# Patient Record
Sex: MALE | Race: WHITE | Employment: FULL TIME | URBAN - METROPOLITAN AREA
[De-identification: names, ages, dates, MRNs, and addresses within clinical notes are randomized per-mention and may not be internally consistent; named-entity substitution may affect disease eponyms.]

---

## 2020-07-05 ENCOUNTER — APPOINTMENT (OUTPATIENT)
Dept: CT IMAGING | Age: 35
End: 2020-07-05
Attending: EMERGENCY MEDICINE
Payer: COMMERCIAL

## 2020-07-05 ENCOUNTER — ANESTHESIA EVENT (OUTPATIENT)
Dept: SURGERY | Age: 35
End: 2020-07-05
Payer: COMMERCIAL

## 2020-07-05 ENCOUNTER — ANESTHESIA (OUTPATIENT)
Dept: SURGERY | Age: 35
End: 2020-07-05
Payer: COMMERCIAL

## 2020-07-05 ENCOUNTER — HOSPITAL ENCOUNTER (OUTPATIENT)
Age: 35
Discharge: HOME | End: 2020-07-06
Attending: EMERGENCY MEDICINE | Admitting: SURGERY
Payer: COMMERCIAL

## 2020-07-05 DIAGNOSIS — K35.30 ACUTE APPENDICITIS WITH LOCALIZED PERITONITIS, WITHOUT PERFORATION, ABSCESS, OR GANGRENE: Primary | ICD-10-CM

## 2020-07-05 PROBLEM — K35.80 ACUTE APPENDICITIS: Status: ACTIVE | Noted: 2020-07-05

## 2020-07-05 LAB
ALBUMIN SERPL-MCNC: 4.3 G/DL (ref 3.5–5)
ALBUMIN/GLOB SERPL: 1.3 {RATIO} (ref 1.1–2.2)
ALP SERPL-CCNC: 72 U/L (ref 45–117)
ALT SERPL-CCNC: 42 U/L (ref 12–78)
ANION GAP SERPL CALC-SCNC: 13 MMOL/L (ref 5–15)
AST SERPL-CCNC: 23 U/L (ref 15–37)
BASOPHILS # BLD: 0 K/UL (ref 0–0.1)
BASOPHILS NFR BLD: 0 % (ref 0–1)
BILIRUB SERPL-MCNC: 0.7 MG/DL (ref 0.2–1)
BUN SERPL-MCNC: 8 MG/DL (ref 6–20)
BUN/CREAT SERPL: 9 (ref 12–20)
CALCIUM SERPL-MCNC: 9 MG/DL (ref 8.5–10.1)
CHLORIDE SERPL-SCNC: 104 MMOL/L (ref 97–108)
CO2 SERPL-SCNC: 23 MMOL/L (ref 21–32)
CREAT SERPL-MCNC: 0.94 MG/DL (ref 0.7–1.3)
DIFFERENTIAL METHOD BLD: ABNORMAL
EOSINOPHIL # BLD: 0 K/UL (ref 0–0.4)
EOSINOPHIL NFR BLD: 0 % (ref 0–7)
ERYTHROCYTE [DISTWIDTH] IN BLOOD BY AUTOMATED COUNT: 13.1 % (ref 11.5–14.5)
GLOBULIN SER CALC-MCNC: 3.4 G/DL (ref 2–4)
GLUCOSE SERPL-MCNC: 130 MG/DL (ref 65–100)
HCT VFR BLD AUTO: 42.7 % (ref 36.6–50.3)
HGB BLD-MCNC: 14.7 G/DL (ref 12.1–17)
IMM GRANULOCYTES # BLD AUTO: 0.1 K/UL (ref 0–0.04)
IMM GRANULOCYTES NFR BLD AUTO: 0 % (ref 0–0.5)
LIPASE SERPL-CCNC: 72 U/L (ref 73–393)
LYMPHOCYTES # BLD: 1 K/UL (ref 0.8–3.5)
LYMPHOCYTES NFR BLD: 6 % (ref 12–49)
MCH RBC QN AUTO: 27.5 PG (ref 26–34)
MCHC RBC AUTO-ENTMCNC: 34.4 G/DL (ref 30–36.5)
MCV RBC AUTO: 79.8 FL (ref 80–99)
MONOCYTES # BLD: 0.6 K/UL (ref 0–1)
MONOCYTES NFR BLD: 4 % (ref 5–13)
NEUTS SEG # BLD: 14.3 K/UL (ref 1.8–8)
NEUTS SEG NFR BLD: 89 % (ref 32–75)
NRBC # BLD: 0 K/UL (ref 0–0.01)
NRBC BLD-RTO: 0 PER 100 WBC
PLATELET # BLD AUTO: 223 K/UL (ref 150–400)
PMV BLD AUTO: 10 FL (ref 8.9–12.9)
POTASSIUM SERPL-SCNC: 3.8 MMOL/L (ref 3.5–5.1)
PROT SERPL-MCNC: 7.7 G/DL (ref 6.4–8.2)
RBC # BLD AUTO: 5.35 M/UL (ref 4.1–5.7)
SODIUM SERPL-SCNC: 140 MMOL/L (ref 136–145)
WBC # BLD AUTO: 16 K/UL (ref 4.1–11.1)

## 2020-07-05 PROCEDURE — 76210000006 HC OR PH I REC 0.5 TO 1 HR: Performed by: SURGERY

## 2020-07-05 PROCEDURE — 96375 TX/PRO/DX INJ NEW DRUG ADDON: CPT

## 2020-07-05 PROCEDURE — 77030018836 HC SOL IRR NACL ICUM -A: Performed by: SURGERY

## 2020-07-05 PROCEDURE — 77030035029 HC NDL INSUF VERES DISP COVD -B: Performed by: SURGERY

## 2020-07-05 PROCEDURE — 85025 COMPLETE CBC W/AUTO DIFF WBC: CPT

## 2020-07-05 PROCEDURE — 77030013079 HC BLNKT BAIR HGGR 3M -A: Performed by: ANESTHESIOLOGY

## 2020-07-05 PROCEDURE — 77030020263 HC SOL INJ SOD CL0.9% LFCR 1000ML: Performed by: SURGERY

## 2020-07-05 PROCEDURE — 77030020747 HC TU INSUF ENDOSC TELE -A: Performed by: SURGERY

## 2020-07-05 PROCEDURE — 77030015927 HC DEV TISS SEAL SURX -F: Performed by: SURGERY

## 2020-07-05 PROCEDURE — 77030009963 HC RELD STPLR ECR J&J -C: Performed by: SURGERY

## 2020-07-05 PROCEDURE — 76060000032 HC ANESTHESIA 0.5 TO 1 HR: Performed by: SURGERY

## 2020-07-05 PROCEDURE — 74011000250 HC RX REV CODE- 250: Performed by: SURGERY

## 2020-07-05 PROCEDURE — 77030010513 HC APPL CLP LIG J&J -C: Performed by: SURGERY

## 2020-07-05 PROCEDURE — 77030008684 HC TU ET CUF COVD -B: Performed by: NURSE ANESTHETIST, CERTIFIED REGISTERED

## 2020-07-05 PROCEDURE — 74011250636 HC RX REV CODE- 250/636: Performed by: NURSE ANESTHETIST, CERTIFIED REGISTERED

## 2020-07-05 PROCEDURE — 77030026438 HC STYL ET INTUB CARD -A: Performed by: NURSE ANESTHETIST, CERTIFIED REGISTERED

## 2020-07-05 PROCEDURE — 96365 THER/PROPH/DIAG IV INF INIT: CPT

## 2020-07-05 PROCEDURE — 74011250636 HC RX REV CODE- 250/636: Performed by: EMERGENCY MEDICINE

## 2020-07-05 PROCEDURE — 74011636320 HC RX REV CODE- 636/320: Performed by: EMERGENCY MEDICINE

## 2020-07-05 PROCEDURE — 80053 COMPREHEN METABOLIC PANEL: CPT

## 2020-07-05 PROCEDURE — 77030027876 HC STPLR ENDOSC FLX PWR J&J -G1: Performed by: SURGERY

## 2020-07-05 PROCEDURE — 77030020829: Performed by: SURGERY

## 2020-07-05 PROCEDURE — 77030031139 HC SUT VCRL2 J&J -A: Performed by: SURGERY

## 2020-07-05 PROCEDURE — 77030014090 HC TRCR OPT AMR -B: Performed by: SURGERY

## 2020-07-05 PROCEDURE — 74011250636 HC RX REV CODE- 250/636: Performed by: SURGERY

## 2020-07-05 PROCEDURE — 77030010031 HC SCIS ENDOSC MPLR J&J -C: Performed by: SURGERY

## 2020-07-05 PROCEDURE — 74011000258 HC RX REV CODE- 258: Performed by: SURGERY

## 2020-07-05 PROCEDURE — 77030008771 HC TU NG SALEM SUMP -A: Performed by: ANESTHESIOLOGY

## 2020-07-05 PROCEDURE — 76010000138 HC OR TIME 0.5 TO 1 HR: Performed by: SURGERY

## 2020-07-05 PROCEDURE — 83690 ASSAY OF LIPASE: CPT

## 2020-07-05 PROCEDURE — 77030016151 HC PROTCTR LNS DFOG COVD -B: Performed by: SURGERY

## 2020-07-05 PROCEDURE — 77030036731 HC STPLR ENDOSC J&J -F: Performed by: SURGERY

## 2020-07-05 PROCEDURE — 77030011640 HC PAD GRND REM COVD -A: Performed by: SURGERY

## 2020-07-05 PROCEDURE — 74011000258 HC RX REV CODE- 258: Performed by: EMERGENCY MEDICINE

## 2020-07-05 PROCEDURE — 77030009851 HC PCH RTVR ENDOSC AMR -B: Performed by: SURGERY

## 2020-07-05 PROCEDURE — 99284 EMERGENCY DEPT VISIT MOD MDM: CPT

## 2020-07-05 PROCEDURE — 77030010507 HC ADH SKN DERMBND J&J -B: Performed by: SURGERY

## 2020-07-05 PROCEDURE — 77030012770 HC TRCR OPT FX AMR -B: Performed by: SURGERY

## 2020-07-05 PROCEDURE — 77030002933 HC SUT MCRYL J&J -A: Performed by: SURGERY

## 2020-07-05 PROCEDURE — 99218 HC RM OBSERVATION: CPT

## 2020-07-05 PROCEDURE — 77030008756 HC TU IRR SUC STRY -B: Performed by: SURGERY

## 2020-07-05 PROCEDURE — 36415 COLL VENOUS BLD VENIPUNCTURE: CPT

## 2020-07-05 PROCEDURE — 74177 CT ABD & PELVIS W/CONTRAST: CPT

## 2020-07-05 PROCEDURE — 74011250637 HC RX REV CODE- 250/637: Performed by: SURGERY

## 2020-07-05 PROCEDURE — 74011000250 HC RX REV CODE- 250: Performed by: NURSE ANESTHETIST, CERTIFIED REGISTERED

## 2020-07-05 PROCEDURE — 88304 TISSUE EXAM BY PATHOLOGIST: CPT

## 2020-07-05 RX ORDER — MIDAZOLAM HYDROCHLORIDE 1 MG/ML
INJECTION, SOLUTION INTRAMUSCULAR; INTRAVENOUS AS NEEDED
Status: DISCONTINUED | OUTPATIENT
Start: 2020-07-05 | End: 2020-07-05 | Stop reason: HOSPADM

## 2020-07-05 RX ORDER — ONDANSETRON 2 MG/ML
INJECTION INTRAMUSCULAR; INTRAVENOUS AS NEEDED
Status: DISCONTINUED | OUTPATIENT
Start: 2020-07-05 | End: 2020-07-05 | Stop reason: HOSPADM

## 2020-07-05 RX ORDER — CEFOXITIN 2 G/1
INJECTION, POWDER, FOR SOLUTION INTRAVENOUS AS NEEDED
Status: DISCONTINUED | OUTPATIENT
Start: 2020-07-05 | End: 2020-07-05 | Stop reason: HOSPADM

## 2020-07-05 RX ORDER — PROPOFOL 10 MG/ML
INJECTION, EMULSION INTRAVENOUS AS NEEDED
Status: DISCONTINUED | OUTPATIENT
Start: 2020-07-05 | End: 2020-07-05 | Stop reason: HOSPADM

## 2020-07-05 RX ORDER — HYDROCODONE BITARTRATE AND ACETAMINOPHEN 5; 325 MG/1; MG/1
2 TABLET ORAL
Status: DISCONTINUED | OUTPATIENT
Start: 2020-07-05 | End: 2020-07-06 | Stop reason: HOSPADM

## 2020-07-05 RX ORDER — FENTANYL CITRATE 50 UG/ML
INJECTION, SOLUTION INTRAMUSCULAR; INTRAVENOUS AS NEEDED
Status: DISCONTINUED | OUTPATIENT
Start: 2020-07-05 | End: 2020-07-05 | Stop reason: HOSPADM

## 2020-07-05 RX ORDER — ONDANSETRON 2 MG/ML
4 INJECTION INTRAMUSCULAR; INTRAVENOUS
Status: DISCONTINUED | OUTPATIENT
Start: 2020-07-05 | End: 2020-07-06 | Stop reason: HOSPADM

## 2020-07-05 RX ORDER — BUPIVACAINE HYDROCHLORIDE 2.5 MG/ML
INJECTION, SOLUTION EPIDURAL; INFILTRATION; INTRACAUDAL AS NEEDED
Status: DISCONTINUED | OUTPATIENT
Start: 2020-07-05 | End: 2020-07-05 | Stop reason: HOSPADM

## 2020-07-05 RX ORDER — HYDROCODONE BITARTRATE AND ACETAMINOPHEN 5; 325 MG/1; MG/1
1 TABLET ORAL
Status: DISCONTINUED | OUTPATIENT
Start: 2020-07-05 | End: 2020-07-06 | Stop reason: HOSPADM

## 2020-07-05 RX ORDER — SODIUM CHLORIDE, SODIUM LACTATE, POTASSIUM CHLORIDE, CALCIUM CHLORIDE 600; 310; 30; 20 MG/100ML; MG/100ML; MG/100ML; MG/100ML
100 INJECTION, SOLUTION INTRAVENOUS CONTINUOUS
Status: DISCONTINUED | OUTPATIENT
Start: 2020-07-05 | End: 2020-07-05 | Stop reason: HOSPADM

## 2020-07-05 RX ORDER — SODIUM CHLORIDE, SODIUM LACTATE, POTASSIUM CHLORIDE, CALCIUM CHLORIDE 600; 310; 30; 20 MG/100ML; MG/100ML; MG/100ML; MG/100ML
125 INJECTION, SOLUTION INTRAVENOUS CONTINUOUS
Status: DISCONTINUED | OUTPATIENT
Start: 2020-07-05 | End: 2020-07-06 | Stop reason: HOSPADM

## 2020-07-05 RX ORDER — PROCHLORPERAZINE EDISYLATE 5 MG/ML
5 INJECTION INTRAMUSCULAR; INTRAVENOUS
Status: DISCONTINUED | OUTPATIENT
Start: 2020-07-05 | End: 2020-07-06 | Stop reason: HOSPADM

## 2020-07-05 RX ORDER — LIDOCAINE HYDROCHLORIDE 20 MG/ML
INJECTION, SOLUTION EPIDURAL; INFILTRATION; INTRACAUDAL; PERINEURAL AS NEEDED
Status: DISCONTINUED | OUTPATIENT
Start: 2020-07-05 | End: 2020-07-05 | Stop reason: HOSPADM

## 2020-07-05 RX ORDER — GLYCOPYRROLATE 0.2 MG/ML
INJECTION INTRAMUSCULAR; INTRAVENOUS AS NEEDED
Status: DISCONTINUED | OUTPATIENT
Start: 2020-07-05 | End: 2020-07-05 | Stop reason: HOSPADM

## 2020-07-05 RX ORDER — KETOROLAC TROMETHAMINE 30 MG/ML
INJECTION, SOLUTION INTRAMUSCULAR; INTRAVENOUS AS NEEDED
Status: DISCONTINUED | OUTPATIENT
Start: 2020-07-05 | End: 2020-07-05 | Stop reason: HOSPADM

## 2020-07-05 RX ORDER — MORPHINE SULFATE 2 MG/ML
2 INJECTION, SOLUTION INTRAMUSCULAR; INTRAVENOUS
Status: DISCONTINUED | OUTPATIENT
Start: 2020-07-05 | End: 2020-07-06 | Stop reason: HOSPADM

## 2020-07-05 RX ORDER — HYDROMORPHONE HYDROCHLORIDE 1 MG/ML
.25-1 INJECTION, SOLUTION INTRAMUSCULAR; INTRAVENOUS; SUBCUTANEOUS
Status: DISCONTINUED | OUTPATIENT
Start: 2020-07-05 | End: 2020-07-05 | Stop reason: HOSPADM

## 2020-07-05 RX ORDER — ROCURONIUM BROMIDE 10 MG/ML
INJECTION, SOLUTION INTRAVENOUS AS NEEDED
Status: DISCONTINUED | OUTPATIENT
Start: 2020-07-05 | End: 2020-07-05 | Stop reason: HOSPADM

## 2020-07-05 RX ORDER — DEXAMETHASONE SODIUM PHOSPHATE 4 MG/ML
INJECTION, SOLUTION INTRA-ARTICULAR; INTRALESIONAL; INTRAMUSCULAR; INTRAVENOUS; SOFT TISSUE AS NEEDED
Status: DISCONTINUED | OUTPATIENT
Start: 2020-07-05 | End: 2020-07-05 | Stop reason: HOSPADM

## 2020-07-05 RX ORDER — CETIRIZINE HCL 10 MG
10 TABLET ORAL
COMMUNITY

## 2020-07-05 RX ORDER — LIDOCAINE HYDROCHLORIDE 10 MG/ML
0.1 INJECTION, SOLUTION EPIDURAL; INFILTRATION; INTRACAUDAL; PERINEURAL AS NEEDED
Status: DISCONTINUED | OUTPATIENT
Start: 2020-07-05 | End: 2020-07-05 | Stop reason: HOSPADM

## 2020-07-05 RX ORDER — SUCCINYLCHOLINE CHLORIDE 20 MG/ML
INJECTION INTRAMUSCULAR; INTRAVENOUS AS NEEDED
Status: DISCONTINUED | OUTPATIENT
Start: 2020-07-05 | End: 2020-07-05 | Stop reason: HOSPADM

## 2020-07-05 RX ADMIN — SODIUM CHLORIDE, SODIUM LACTATE, POTASSIUM CHLORIDE, AND CALCIUM CHLORIDE 125 ML/HR: 600; 310; 30; 20 INJECTION, SOLUTION INTRAVENOUS at 17:03

## 2020-07-05 RX ADMIN — MIDAZOLAM HYDROCHLORIDE 2 MG: 2 INJECTION, SOLUTION INTRAMUSCULAR; INTRAVENOUS at 20:53

## 2020-07-05 RX ADMIN — FENTANYL CITRATE 50 MCG: 50 INJECTION, SOLUTION INTRAMUSCULAR; INTRAVENOUS at 21:11

## 2020-07-05 RX ADMIN — KETOROLAC TROMETHAMINE 30 MG: 30 INJECTION INTRAMUSCULAR; INTRAVENOUS at 21:35

## 2020-07-05 RX ADMIN — CEFOXITIN SODIUM 2 G: 2 POWDER, FOR SOLUTION INTRAVENOUS at 21:08

## 2020-07-05 RX ADMIN — PIPERACILLIN AND TAZOBACTAM 3.38 G: 3; .375 INJECTION, POWDER, LYOPHILIZED, FOR SOLUTION INTRAVENOUS at 22:22

## 2020-07-05 RX ADMIN — FENTANYL CITRATE 100 MCG: 50 INJECTION, SOLUTION INTRAMUSCULAR; INTRAVENOUS at 20:58

## 2020-07-05 RX ADMIN — GLYCOPYRROLATE 0.2 MG: 0.2 INJECTION, SOLUTION INTRAMUSCULAR; INTRAVENOUS at 21:08

## 2020-07-05 RX ADMIN — ROCURONIUM BROMIDE 5 MG: 10 INJECTION, SOLUTION INTRAVENOUS at 20:58

## 2020-07-05 RX ADMIN — PIPERACILLIN AND TAZOBACTAM 3.38 G: 3; .375 INJECTION, POWDER, LYOPHILIZED, FOR SOLUTION INTRAVENOUS at 13:31

## 2020-07-05 RX ADMIN — ONDANSETRON HYDROCHLORIDE 4 MG: 2 SOLUTION INTRAMUSCULAR; INTRAVENOUS at 21:08

## 2020-07-05 RX ADMIN — LIDOCAINE HYDROCHLORIDE 60 MG: 20 INJECTION, SOLUTION EPIDURAL; INFILTRATION; INTRACAUDAL; PERINEURAL at 20:58

## 2020-07-05 RX ADMIN — IOPAMIDOL 100 ML: 755 INJECTION, SOLUTION INTRAVENOUS at 12:51

## 2020-07-05 RX ADMIN — SODIUM CHLORIDE, SODIUM LACTATE, POTASSIUM CHLORIDE, AND CALCIUM CHLORIDE: 600; 310; 30; 20 INJECTION, SOLUTION INTRAVENOUS at 21:50

## 2020-07-05 RX ADMIN — PROCHLORPERAZINE EDISYLATE 5 MG: 5 INJECTION INTRAMUSCULAR; INTRAVENOUS at 17:45

## 2020-07-05 RX ADMIN — SUCCINYLCHOLINE CHLORIDE 120 MG: 20 INJECTION, SOLUTION INTRAMUSCULAR; INTRAVENOUS; PARENTERAL at 20:59

## 2020-07-05 RX ADMIN — SUGAMMADEX 336 MG: 100 INJECTION, SOLUTION INTRAVENOUS at 21:42

## 2020-07-05 RX ADMIN — HYDROCODONE BITARTRATE AND ACETAMINOPHEN 1 TABLET: 5; 325 TABLET ORAL at 22:55

## 2020-07-05 RX ADMIN — SODIUM CHLORIDE, SODIUM LACTATE, POTASSIUM CHLORIDE, AND CALCIUM CHLORIDE 125 ML/HR: 600; 310; 30; 20 INJECTION, SOLUTION INTRAVENOUS at 14:50

## 2020-07-05 RX ADMIN — PROPOFOL 200 MG: 10 INJECTION, EMULSION INTRAVENOUS at 20:58

## 2020-07-05 RX ADMIN — SODIUM CHLORIDE, SODIUM LACTATE, POTASSIUM CHLORIDE, AND CALCIUM CHLORIDE: 600; 310; 30; 20 INJECTION, SOLUTION INTRAVENOUS at 20:00

## 2020-07-05 RX ADMIN — ROCURONIUM BROMIDE 25 MG: 10 INJECTION, SOLUTION INTRAVENOUS at 21:07

## 2020-07-05 RX ADMIN — DEXAMETHASONE SODIUM PHOSPHATE 4 MG: 4 INJECTION, SOLUTION INTRAMUSCULAR; INTRAVENOUS at 21:08

## 2020-07-05 RX ADMIN — PROMETHAZINE HYDROCHLORIDE 25 MG: 25 INJECTION INTRAMUSCULAR; INTRAVENOUS at 11:35

## 2020-07-05 RX ADMIN — FENTANYL CITRATE 50 MCG: 50 INJECTION, SOLUTION INTRAMUSCULAR; INTRAVENOUS at 21:50

## 2020-07-05 RX ADMIN — SODIUM CHLORIDE 1000 ML: 900 INJECTION, SOLUTION INTRAVENOUS at 11:34

## 2020-07-05 RX ADMIN — FENTANYL CITRATE 50 MCG: 50 INJECTION, SOLUTION INTRAMUSCULAR; INTRAVENOUS at 21:26

## 2020-07-05 NOTE — ROUTINE PROCESS
TRANSFER - OUT REPORT: 
 
Verbal report given to COMFORT Valentine RN(name) on Ki Michael  being transferred to [facility] (unit) for routine progression of care Report consisted of patients Situation, Background, Assessment and  
Recommendations(SBAR). Information from the following report(s) SBAR, MAR, vitals, pain,  all test results, LR IV ordered per RN with start on arrival to hospital all was reviewed with the receiving nurse. Lines:  
Saline Lock 07/05/20 Right Antecubital (Active) Opportunity for questions and clarification was provided. Patient transported with: [facility] BLS monitoring. Reassessment at this time is improved with less nausea. Pt is stable for transport.

## 2020-07-05 NOTE — ED NOTES
TRANSFER - OUT REPORT:    Verbal report given to Luiz Aragon NRP with [facility](name) on Davis Grams  being transferred to [address] [place] [address] [address](unit) for ordered procedure       Report consisted of patients Situation, Background, Assessment and   Recommendations(SBAR). Information from the following report(s) SBAR, MAR, vitals, all test results was reviewed with the NRP with [facility]. Lines:   Saline Lock 07/05/20 Right Antecubital (Active)        Opportunity for questions and clarification was provided. Patient transported with: [facility] ALS monitoring /hr reassessment at this time is unchanged pt is stable for transport.

## 2020-07-05 NOTE — ED NOTES
Purposeful rounding done. Pt sitting up on stretcher. Offered assist with any needs. Pt states \"pain level 4 and no needs at this time. \" Pt and wife updated on transfer to [facility] room 411 made aware of Morphine and Zofran orders. Pt states \"no need at this time. \" Pt and wife verbalize good understanding and agreement with plan. IVF infusing as ordered.

## 2020-07-05 NOTE — ED TRIAGE NOTES
Pt ambulated to the treatment area with a steady gait. Pt states \"I had a steak at [facility] yesterday at 630pm by midnight I started throwing up I went to [facility] they gave me IV fluids and Zofran dissolvable. It did not help. \" Pt denies fevers.

## 2020-07-05 NOTE — ED PROVIDER NOTES
Mr. Jaime Neal is a 42-year-old male who presents to the ER with complaints of abdominal pain and nausea vomiting. He said that he ate dinner at [facility] last night. Shortly afterwards, he developed a discomfort in his abdomen and began vomiting. He said he is thrown up many times since then. He states that discomfort in his belly is not pain but it feels \"like there is a rock in my belly. \"  He denies any fevers or chills. He denies similar symptoms in the past.  No sick contacts. No one else who ate with him has similar symptoms. No changes with his urine. No changes with his bowel movements. He has had a bowel movement early this morning. He went to an urgent care and was given a liter fluid and Zofran. He said that his nausea persist.  Therefore, he was sent to the ER. History reviewed. No pertinent past medical history. Past Surgical History:   Procedure Laterality Date    REMOVAL OF HYDROCELE      testicles          History reviewed. No pertinent family history.     Social History     Socioeconomic History    Marital status:      Spouse name: Not on file    Number of children: Not on file    Years of education: Not on file    Highest education level: Not on file   Occupational History    Not on file   Social Needs    Financial resource strain: Not on file    Food insecurity     Worry: Not on file     Inability: Not on file    Transportation needs     Medical: Not on file     Non-medical: Not on file   Tobacco Use    Smoking status: Never Smoker    Smokeless tobacco: Never Used   Substance and Sexual Activity    Alcohol use: No    Drug use: No    Sexual activity: Not on file   Lifestyle    Physical activity     Days per week: Not on file     Minutes per session: Not on file    Stress: Not on file   Relationships    Social connections     Talks on phone: Not on file     Gets together: Not on file     Attends [employer] service: Not on file     Active member of club or organization: Not on file     Attends meetings of clubs or organizations: Not on file     Relationship status: Not on file    Intimate partner violence     Fear of current or ex partner: Not on file     Emotionally abused: Not on file     Physically abused: Not on file     Forced sexual activity: Not on file   Other Topics Concern    Not on file   Social History Narrative    Not on file         ALLERGIES: Patient has no known allergies. Review of Systems   Constitutional: Negative for chills and fever. HENT: Negative for rhinorrhea and sore throat. Respiratory: Negative for cough and shortness of breath. Cardiovascular: Negative for chest pain. Gastrointestinal: Positive for abdominal pain, nausea and vomiting. Negative for diarrhea. Genitourinary: Negative for dysuria and hematuria. Musculoskeletal: Negative for arthralgias and myalgias. Skin: Negative for pallor and rash. Neurological: Negative for dizziness, weakness and light-headedness. All other systems reviewed and are negative. There were no vitals filed for this visit. Physical Exam     Vital signs reviewed. Nursing notes reviewed.     Const:  No acute distress, well developed, well nourished  Head:  Atraumatic, normocephalic  Eyes:  PERRL, conjunctiva normal, no scleral icterus  Neck:  Supple, trachea midline  Cardiovascular:  RRR,   Resp:  No resp distress, no increased work of breathing,   Abd:  Soft,mild diffuse abdominal tenderness, non-distended, no rebound, no guarding, no CVA tenderness  MSK:  No pedal edema, normal ROM  Neuro:  Alert and oriented x3, no cranial nerve defect  Skin:  Warm, dry, intact  Psych: normal mood and affect, behavior is normal, judgement and thought content is normal          MDM  Number of Diagnoses or Management Options  Acute appendicitis with localized peritonitis, without perforation, abscess, or gangrene:      Amount and/or Complexity of Data Reviewed  Clinical lab tests: ordered and reviewed  Tests in the radiology section of CPT®: ordered and reviewed  Review and summarize past medical records: yes    Patient Progress  Patient progress: stable          Mr. Jonathan Aguila is a 58-year-old male who presents to the ER with lower abdominal pain and vomiting. The CT shows appendicitis. I spoke with the surgeon who will admit him for further care.       Procedures

## 2020-07-05 NOTE — PROGRESS NOTES
Zosyn 3.375 gm IV Q6H changed to Zosyn 3.375 gm IV Q8H (extended-interval infusion over 240 minutes) per protocol. Will start new bag 6 hours after first dose d/t kinetics since first bag was infused over 30 minutes. Thank you,  Goran CHAVEZ [facility]

## 2020-07-06 VITALS
WEIGHT: 184.97 LBS | BODY MASS INDEX: 28.12 KG/M2 | OXYGEN SATURATION: 96 % | RESPIRATION RATE: 16 BRPM | TEMPERATURE: 98.1 F | HEART RATE: 65 BPM | DIASTOLIC BLOOD PRESSURE: 74 MMHG | SYSTOLIC BLOOD PRESSURE: 116 MMHG

## 2020-07-06 PROCEDURE — 74011000258 HC RX REV CODE- 258: Performed by: SURGERY

## 2020-07-06 PROCEDURE — 74011250636 HC RX REV CODE- 250/636: Performed by: SURGERY

## 2020-07-06 RX ORDER — HYDROCODONE BITARTRATE AND ACETAMINOPHEN 5; 325 MG/1; MG/1
1 TABLET ORAL
Qty: 15 TAB | Refills: 0 | Status: SHIPPED | OUTPATIENT
Start: 2020-07-06 | End: 2020-07-11

## 2020-07-06 RX ADMIN — PIPERACILLIN AND TAZOBACTAM 3.38 G: 3; .375 INJECTION, POWDER, LYOPHILIZED, FOR SOLUTION INTRAVENOUS at 06:08

## 2020-07-06 RX ADMIN — SODIUM CHLORIDE, SODIUM LACTATE, POTASSIUM CHLORIDE, AND CALCIUM CHLORIDE 125 ML/HR: 600; 310; 30; 20 INJECTION, SOLUTION INTRAVENOUS at 06:08

## 2020-07-06 NOTE — DISCHARGE SUMMARY
Discharge Summary    Patient: Ki Michael               Sex: male          DOA: [unfilled]       YOB: 1985      Age:  38 y.o.        LOS:  LOS: 0 days                Admit Date: 7/5/2020    Discharge Date: 7/6/2020    Admission Diagnoses: Acute appendicitis [K35.80]    Discharge Diagnoses:    Problem List as of 7/6/2020 Date Reviewed: 11/29/2011          Codes Class Noted - Resolved    Acute appendicitis ICD-10-CM: K35.80  ICD-9-CM: 540.9  7/5/2020 - Present              Discharge Condition: Stable    Hospital Course: 37 yo male admitted with acute appendicitis. He was a taken to the OR and underwent lap appy. He did well. His pain is controlled with PO pain meds, he has tolerated a diet and ambulated. He will be d/c home with f/u in the office. Discharge Medications:     Current Discharge Medication List      CONTINUE these medications which have NOT CHANGED    Details   cetirizine (ZyrTEC) 10 mg tablet Take 10 mg by mouth daily as needed for Allergies. Activity: No heavy lifting for the next 2 weeks. No driving until no longer taking pain meds and abdominal soreness resolves. Diet: Regular Diet    Wound Care: Ok to shower, pat dry incision sites. Follow-up: 10-14 days with Dr. Ajay Valdez. Call to schedule your appointment, [phone].

## 2020-07-06 NOTE — PROGRESS NOTES
Pt on observation status for acute appendicitis. Letter explained and provided to pt and his wife. No questions or concerns noted. Care Management Interventions  PCP Verified by CM: (Pt does not have a PCP.   List provided and infomration on [facility])  Discharge Durable Medical Equipment: No  Physical Therapy Consult: No  Occupational Therapy Consult: No  Speech Therapy Consult: No  Current Support Network: Lives with Spouse  Confirm Follow Up Transport: Family  Discharge Location  Discharge Placement: [place], [place]

## 2020-07-06 NOTE — PROGRESS NOTES
Problem: Falls - Risk of  Goal: *Absence of Falls  Description: Document Jocelyne Viveros Fall Risk and appropriate interventions in the flowsheet.   Outcome: Progressing Towards Goal  Note: Fall Risk Interventions:            Medication Interventions: Evaluate medications/consider consulting pharmacy, Patient to call before getting OOB, Teach patient to arise slowly                   Problem: Patient Education: Go to Patient Education Activity  Goal: Patient/Family Education  Outcome: Progressing Towards Goal     Problem: Pain  Goal: *Control of Pain  Outcome: Progressing Towards Goal     Problem: Infection - Risk of, Surgical Site Infection  Goal: *Absence of surgical site infection signs and symptoms  Outcome: Progressing Towards Goal

## 2020-07-06 NOTE — PERIOP NOTES
TRANSFER - OUT REPORT via phone    Verbal report given to KARIS Cruz on Hallie Plasencia  being transferred to [phone] for routine post - op       Report consisted of patients Situation, Background, Assessment and   Recommendations(SBAR). Information from the following report(s) SBAR, OR Summary, Intake/Output and MAR was reviewed with the receiving nurse. Lines:   Saline Lock 07/05/20 Right Antecubital (Active)   Site Assessment Clean, dry, & intact 7/5/2020 10:36 PM   Phlebitis Assessment 0 7/5/2020 10:36 PM   Infiltration Assessment 0 7/5/2020 10:36 PM   Dressing Status Clean, dry, & intact 7/5/2020 10:36 PM   Dressing Type Transparent;Tape 7/5/2020 10:36 PM   Hub Color/Line Status Patent; Infusing;Pink 7/5/2020 10:36 PM   Action Taken Open ports on tubing capped 7/5/2020  7:55 PM        Opportunity for questions and clarification was provided.       Patient transported with:   Registered Nurse

## 2020-07-06 NOTE — PROGRESS NOTES
Problem: Falls - Risk of  Goal: *Absence of Falls  Description: Document Ena Zendejas Fall Risk and appropriate interventions in the flowsheet.   Outcome: Progressing Towards Goal  Note: Fall Risk Interventions:  Mobility Interventions: PT Consult for mobility concerns, Strengthening exercises (ROM-active/passive)         Medication Interventions: Teach patient to arise slowly                   Problem: Patient Education: Go to Patient Education Activity  Goal: Patient/Family Education  Outcome: Progressing Towards Goal     Problem: Pain  Goal: *Control of Pain  Outcome: Progressing Towards Goal     Problem: Infection - Risk of, Surgical Site Infection  Goal: *Absence of surgical site infection signs and symptoms  Outcome: Progressing Towards Goal

## 2020-07-06 NOTE — H&P
Surgery History and Physical    Subjective: Dariana Friedman is a 38 y.o. male who is being admitted for abdominal pain. The pain is located in the RLQ without radiation . Wilfred Fruit Pain is described as dull, pressure and steady and measures 7/10 in intensity. Onset of pain was 1 day ago. Aggravating factors include movement. Alleviating factors include none. Associated symptoms include nausea and vomiting. Patient Active Problem List    Diagnosis Date Noted    Acute appendicitis 07/05/2020     History reviewed. No pertinent past medical history. Past Surgical History:   Procedure Laterality Date    REMOVAL OF HYDROCELE      testicles       Social History     Tobacco Use    Smoking status: Never Smoker    Smokeless tobacco: Never Used   Substance Use Topics    Alcohol use: No      History reviewed. No pertinent family history. Prior to Admission medications    Medication Sig Start Date End Date Taking? Authorizing Provider   cetirizine (ZyrTEC) 10 mg tablet Take 10 mg by mouth daily as needed for Allergies. Yes Provider, Historical     No Known Allergies     Review of Systems:    A comprehensive review of systems was negative except for that written in the History of Present Illness.      Objective:     Visit Vitals  /90 (BP 1 Location: Left arm, BP Patient Position: At rest)   Pulse 74   Temp 98.2 °F (36.8 °C)   Resp 18   Wt 83.9 kg (184 lb 15.5 oz)   SpO2 100%   BMI 28.12 kg/m²       Physical Exam:    GENERAL: alert, cooperative, no distress, appears stated age, EYE: negative findings: anicteric sclera, LYMPH NODES: no inguinal LAD , THROAT & NECK: mucous membranes moist, LUNG: clear, nonlabored breathing, HEART: regular rate and rhythm, ABDOMEN: soft, lower abd TTP, EXTREMITIES:  extremities normal, atraumatic, no cyanosis or edema, SKIN: Normal. and no rash or abnormalities, NEUROLOGIC: negative findings: alert, oriented x3, PSYCH: non focal    Imaging:  images and reports reviewed    Lab Review: Recent Results (from the past 24 hour(s))   METABOLIC PANEL, COMPREHENSIVE    Collection Time: 07/05/20 11:21 AM   Result Value Ref Range    Sodium 140 136 - 145 mmol/L    Potassium 3.8 3.5 - 5.1 mmol/L    Chloride 104 97 - 108 mmol/L    CO2 23 21 - 32 mmol/L    Anion gap 13 5 - 15 mmol/L    Glucose 130 (H) 65 - 100 mg/dL    BUN 8 6 - 20 MG/DL    Creatinine 0.94 0.70 - 1.30 MG/DL    BUN/Creatinine ratio 9 (L) 12 - 20      GFR est AA >60 >60 ml/min/1.73m2    GFR est non-AA >60 >60 ml/min/1.73m2    Calcium 9.0 8.5 - 10.1 MG/DL    Bilirubin, total 0.7 0.2 - 1.0 MG/DL    ALT (SGPT) 42 12 - 78 U/L    AST (SGOT) 23 15 - 37 U/L    Alk. phosphatase 72 45 - 117 U/L    Protein, total 7.7 6.4 - 8.2 g/dL    Albumin 4.3 3.5 - 5.0 g/dL    Globulin 3.4 2.0 - 4.0 g/dL    A-G Ratio 1.3 1.1 - 2.2     CBC WITH AUTOMATED DIFF    Collection Time: 07/05/20 11:21 AM   Result Value Ref Range    WBC 16.0 (H) 4.1 - 11.1 K/uL    RBC 5.35 4.10 - 5.70 M/uL    HGB 14.7 12.1 - 17.0 g/dL    HCT 42.7 36.6 - 50.3 %    MCV 79.8 (L) 80.0 - 99.0 FL    MCH 27.5 26.0 - 34.0 PG    MCHC 34.4 30.0 - 36.5 g/dL    RDW 13.1 11.5 - 14.5 %    PLATELET 721 920 - 361 K/uL    MPV 10.0 8.9 - 12.9 FL    NRBC 0.0 0.0  WBC    ABSOLUTE NRBC 0.00 0.00 - 0.01 K/uL    NEUTROPHILS 89 (H) 32 - 75 %    LYMPHOCYTES 6 (L) 12 - 49 %    MONOCYTES 4 (L) 5 - 13 %    EOSINOPHILS 0 0 - 7 %    BASOPHILS 0 0 - 1 %    IMMATURE GRANULOCYTES 0 0 - 0.5 %    ABS. NEUTROPHILS 14.3 (H) 1.8 - 8.0 K/UL    ABS. LYMPHOCYTES 1.0 0.8 - 3.5 K/UL    ABS. MONOCYTES 0.6 0.0 - 1.0 K/UL    ABS. EOSINOPHILS 0.0 0.0 - 0.4 K/UL    ABS. BASOPHILS 0.0 0.0 - 0.1 K/UL    ABS. IMM. GRANS. 0.1 (H) 0.00 - 0.04 K/UL    DF AUTOMATED     LIPASE    Collection Time: 07/05/20 11:21 AM   Result Value Ref Range    Lipase 72 (L) 73 - 393 U/L     CT Results  (Last 48 hours)               07/05/20 1250  CT ABD PELV W CONT Final result    Impression:  IMPRESSION:       1. Findings consistent with acute appendicitis. No evidence of organized fluid   collection or free intraperitoneal air. Narrative:  EXAM:  CT ABD PELV W CONT       INDICATION: abd pain, vomiting        COMPARISON: None. CONTRAST:  100 mL of Isovue-370. TECHNIQUE:    Following the uneventful intravenous administration of contrast, thin axial   images were obtained through the abdomen and pelvis. Coronal and sagittal   reconstructions were generated. Oral contrast was not administered. CT dose   reduction was achieved through use of a standardized protocol tailored for this   examination and automatic exposure control for dose modulation. FINDINGS:    Lower Thorax:   Lung Bases: Clear. Heart: The heart is normal in size. No pericardial effusion. Abdomen/Pelvis:   Liver:  No focal liver lesions. Biliary system: Gallbladder is unremarkable. No intrahepatic or extrahepatic   biliary ductal dilatation. Spleen: Normal.       Pancreas: Normal.       Kidneys/Ureters/Bladder: An 8 mm hypodensity in the lower pole the right kidney,   statistically a benign cyst. There is cortical scarring noted in the upper pole   of the left kidney. No renal or ureteral calculi. No hydronephrosis or   hydroureter. The bladder is normal.       Adrenals: Normal.       Stomach/bowel: Fluid-filled dilated appendix measuring up to 16 mm in diameter,   with wall thickening, hyperemia, and surrounding fat stranding. No surrounding   organized fluid collection. No free intraperitoneal air noted. Reproductive Organs: Prostate is normal in size. Small fat-containing left   inguinal hernia. Vasculature: Normal caliber arteries. Portal vein, SMV, and splenic vein are   patent. Nodes: No pathologically enlarged lymph nodes. Fluid: No free fluid. Bones/Soft Tissue: No acute fractures or aggressive osseous lesions are seen. Assessment:     Abdominal pain, suspect acute appendicitis. Plan:     1.  I recommend proceeding with laparoscopic appendectomy. 2. Discussed aspects of surgical intervention, methods, risks (including by not limited to infection, bleeding, hematoma, and perforation of the intestines or solid organs), and the risks of general anesthetic. The patient understands the risks; any and all questions were answered to the patient's satisfaction.

## 2020-07-06 NOTE — ANESTHESIA POSTPROCEDURE EVALUATION
Procedure(s):  APPENDECTOMY LAPAROSCOPIC. general    Anesthesia Post Evaluation      Multimodal analgesia: multimodal analgesia not used between 6 hours prior to anesthesia start to PACU discharge  Patient location during evaluation: PACU  Patient participation: complete - patient participated  Level of consciousness: awake  Pain management: adequate  Airway patency: patent  Anesthetic complications: no  Cardiovascular status: acceptable, blood pressure returned to baseline and hemodynamically stable  Respiratory status: acceptable  Hydration status: acceptable  Post anesthesia nausea and vomiting:  controlled      INITIAL Post-op Vital signs:   Vitals Value Taken Time   /73 7/5/2020 10:15 PM   Temp 36.9 °C (98.4 °F) 7/5/2020  9:55 PM   Pulse 81 7/5/2020 10:18 PM   Resp 10 7/5/2020 10:18 PM   SpO2 99 % 7/5/2020 10:18 PM   Vitals shown include unvalidated device data.

## 2020-07-06 NOTE — OP NOTES
Isaac Gan [facility] 78  OPERATIVE REPORT    Name:  Jeremy Perez  MR#:  [id number]  :  1985  ACCOUNT #:  [de-identified]  DATE OF SERVICE:  2020    PREOPERATIVE DIAGNOSIS:  Acute appendicitis. POSTOPERATIVE DIAGNOSIS:  Acute appendicitis. PROCEDURE PERFORMED:  Laparoscopic appendectomy. SURGEON:  Margie Mustafa. Pau Osman MD    ASSISTANT:      ANESTHESIA:  General endotracheal.    ESTIMATED BLOOD LOSS:  Minimal.    SPECIMENS REMOVED:  Appendix. COMPLICATIONS:  None. IMPLANTS:  None. FINDINGS:  Acute retrocecal appendicitis. INDICATIONS FOR OPERATION:  Please see H and P from the same date. PROCEDURE:  The patient was brought to the operating room and placed supine on the table. SCDs were placed on his bilateral lower extremities. General endotracheal anesthesia was administered without difficulty. The abdomen was prepped and draped in the usual sterile fashion. IV cefoxitin was given. After a timeout was performed, an infraumbilical incision was made and a Veress needle was used to gain access into the peritoneal cavity. The abdomen was then insufflated with carbon dioxide gas and a 12 mm blunt-tipped trocar placed through the incision. A laparoscope was introduced. The abdominal cavity was surveyed with no gross abnormalities and no evidence of bowel injury. Two more 5 mm blunt-tipped trocars were then placed under direct vision, one in the left lower quadrant and one in the suprapubic area. The patient was placed in a Trendelenburg position. The appendix was in a retrocecal location and some attachments from the cecum to the lateral side wall had to be taken down with sharp dissection prior to accessing the appendix. Once these were cleared, I was able to dissect the appendix out of its retrocecal location. There was no evidence of any perforation. A window was then made between the base of the appendix and the mesoappendix.   An Cass 45 stapler with a blue load was used to transect the appendix at its base. The same stapler with the white load was used to transect the mesoappendix. The appendix was then placed in an EndoCatch bag and removed through the umbilical incision. The staple lines were inspected and hemostasis was confirmed. The fascia of the umbilicus was closed with a 0 Vicryl figure-of-eight suture placed with an Endoclose device. Pneumoperitoneum was released. The remaining trocars were removed. All skin incisions were then closed with 4-0 Monocryl subcuticular sutures and Dermabond. All sponge, needle, and instrument counts at the end of the case were correct. The patient was allowed to awake from anesthetic and transferred to the recovery room in good condition.       Jaquelin Rodarte MD [id number]  D:  07/05/2020 21:38  T:  07/06/2020 0:21  JOB #:  [id number]

## 2020-07-06 NOTE — PROGRESS NOTES
Problem: Falls - Risk of  Goal: *Absence of Falls  Description: Document Teodora Muniz Fall Risk and appropriate interventions in the flowsheet.   7/6/2020 1128 by Stacy Raman  Outcome: Resolved/Met  Note: Fall Risk Interventions:  Mobility Interventions: PT Consult for mobility concerns, Strengthening exercises (ROM-active/passive)         Medication Interventions: Teach patient to arise slowly                7/6/2020 0905 by Stacy Raman  Outcome: Progressing Towards Goal  Note: Fall Risk Interventions:  Mobility Interventions: PT Consult for mobility concerns, Strengthening exercises (ROM-active/passive)         Medication Interventions: Teach patient to arise slowly                   Problem: Patient Education: Go to Patient Education Activity  Goal: Patient/Family Education  7/6/2020 1128 by Stacy Raman  Outcome: Resolved/Met  7/6/2020 0905 by Stacy Raman  Outcome: Progressing Towards Goal     Problem: Pain  Goal: *Control of Pain  7/6/2020 1128 by Stacy Raman  Outcome: Resolved/Met  7/6/2020 0905 by Stacy Raman  Outcome: Progressing Towards Goal     Problem: Infection - Risk of, Surgical Site Infection  Goal: *Absence of surgical site infection signs and symptoms  7/6/2020 1128 by Stacy Raman  Outcome: Resolved/Met  7/6/2020 0905 by Stcay Raman  Outcome: Progressing Towards Goal

## 2020-07-06 NOTE — DISCHARGE INSTRUCTIONS
Patient Education        Appendectomy: What to Expect at Home  Your Recovery     Your doctor removed your appendix either by making many small cuts, called incisions, in your belly (laparoscopic surgery) or through open surgery. In open surgery, the doctor makes one large incision. The incisions leave scars that usually fade over time. After your surgery, it is normal to feel weak and tired for several days after you return home. Your belly may be swollen and may be painful. If you had laparoscopic surgery, you may have pain in your shoulder for about 24 hours. You may also feel sick to your stomach and have diarrhea, constipation, gas, or a headache. This usually goes away in a few days. Your recovery time depends on the type of surgery you had. If you had laparoscopic surgery, you will probably be able to return to work or a normal routine 1 to 3 weeks after surgery. If you had an open surgery, it may take 2 to 4 weeks. If your appendix ruptured, you may have a drain in your incision. Your body will work fine without an appendix. You will not have to make any changes in your diet or lifestyle. This care sheet gives you a general idea about how long it will take for you to recover. But each person recovers at a different pace. Follow the steps below to get better as quickly as possible. How can you care for yourself at home? Activity  · Rest when you feel tired. Getting enough sleep will help you recover. · Try to walk each day. Start by walking a little more than you did the day before. Bit by bit, increase the amount you walk. Walking boosts blood flow and helps prevent pneumonia and constipation. · For about 2 weeks, avoid lifting anything that would make you strain. This may include a child, heavy grocery bags and milk containers, a heavy briefcase or backpack, cat litter or dog food bags, or a vacuum .   · Avoid strenuous activities, such as bicycle riding, jogging, weight lifting, or aerobic exercise, until your doctor says it is okay. · You may be able to take showers (unless you have a drain near your incision) 24 to 48 hours after surgery. Pat the incision dry. Do not take a bath for the first 2 weeks, or until your doctor tells you it is okay. If you have a drain near your incision, follow your doctor's instructions. · You may drive when you are no longer taking pain medicine and can quickly move your foot from the gas pedal to the brake. You must also be able to sit comfortably for a long period of time, even if you do not plan on going far. You might get caught in traffic. · You will probably be able to go back to work in 1 to 3 weeks. If you had an open surgery, it may take 3 to 4 weeks. · Your doctor will tell you when you can have sex again. Diet  · You can eat your normal diet. If your stomach is upset, try bland, low-fat foods like plain rice, broiled chicken, toast, and yogurt. · Drink plenty of fluids (unless your doctor tells you not to). · You may notice that your bowel movements are not regular right after your surgery. This is common. Try to avoid constipation and straining with bowel movements. You may want to take a fiber supplement every day. If you have not had a bowel movement after a couple of days, ask your doctor about taking a mild laxative. Medicines  · Your doctor will tell you if and when you can restart your medicines. He or she will also give you instructions about taking any new medicines. · If you take aspirin or some other blood thinner, ask your doctor if and when to start taking it again. Make sure that you understand exactly what your doctor wants you to do. · If your appendix ruptured, you will need to take antibiotics. Take them as directed. Do not stop taking them just because you feel better. You need to take the full course of antibiotics. · Be safe with medicines. Take pain medicines exactly as directed.   ? If the doctor gave you a prescription medicine for pain, take it as prescribed. ? If you are not taking a prescription pain medicine, take an over-the-counter medicine such as acetaminophen (Tylenol), ibuprofen (Advil, Motrin), or naproxen (Aleve). Read and follow all instructions on the label. ? Do not take two or more pain medicines at the same time unless the doctor told you to. Many pain medicines have acetaminophen, which is Tylenol. Too much Tylenol can be harmful. · If you think your pain medicine is making you sick to your stomach:  ? Take your medicine after meals (unless your doctor has told you not to). ? Ask your doctor for a different pain medicine. Incision care  · If you had an open surgery, you may have staples in your incision. The doctor will take these out in 7 to 10 days. · If you have strips of tape on the incision, leave the tape on for a week or until it falls off. · You may wash the area with warm, soapy water 24 to 48 hours after your surgery, unless your doctor tells you not to. Pat the area dry. · Keep the area clean and dry. You may cover it with a gauze bandage if it weeps or rubs against clothing. Change the bandage every day. · If your appendix ruptured, you may have an incision with packing in it. Change the packing as often as your doctor tells you to. ? Packing changes may hurt at first. Taking pain medicine about half an hour before you change the dressing can help. ? If your dressing sticks to your wound, try soaking it with warm water for about 10 minutes before you remove it. You can do this in the shower or by placing a wet washcloth over the dressing. ? Remove the old packing and flush the incision with water. Gently pat the top area dry. ? The size of the incision determines how much gauze you need to put inside. Fold the gauze over once, but do not wad it up so that it hurts. Put it in the wound carefully. You want to keep the sides of the wound from touching.  A cotton swab may help you push the gauze in as needed. ? Put a gauze pad over the wound, and tape it down. ? You may notice greenish gray fluid seeping from your wound as you start to heal. This is normal. It is a sign that your wound is healing. Follow-up care is a key part of your treatment and safety. Be sure to make and go to all appointments, and call your doctor if you are having problems. It's also a good idea to know your test results and keep a list of the medicines you take. When should you call for help? [id number] anytime you think you may need emergency care. For example, call if:  · You passed out (lost consciousness). · You are short of breath. .  Call your doctor now or seek immediate medical care if:  · You are sick to your stomach and cannot drink fluids. · You cannot pass stools or gas. · You have pain that does not get better when you take your pain medicine. · You have signs of infection, such as:  ? Increased pain, swelling, warmth, or redness. ? Red streaks leading from the wound. ? Pus draining from the wound. ? A fever. · You have loose stitches, or your incision comes open. · Bright red blood has soaked through the bandage over your incision. · You have signs of a blood clot in your leg (called a deep vein thrombosis), such as:  ? Pain in your calf, back of knee, thigh, or groin. ? Redness and swelling in your leg or groin. Watch closely for any changes in your health, and be sure to contact your doctor if you have any problems. Where can you learn more? Go to http://kelly-sharon.info/  Enter X801 in the search box to learn more about \"Appendectomy: What to Expect at Home. \"  Current as of: August 12, 2019               Content Version: 12.5  © 7451-0882 [employer]. Care instructions adapted under license by [employer] (which disclaims liability or warranty for this information).  If you have questions about a medical condition or this instruction, always ask your healthcare professional. [address] any warranty or liability for your use of this information.

## 2020-07-06 NOTE — ANESTHESIA PREPROCEDURE EVALUATION
Relevant Problems   No relevant active problems       Anesthetic History     PONV          Review of Systems / Medical History  Patient summary reviewed and pertinent labs reviewed    Pulmonary  Within defined limits                 Neuro/Psych   Within defined limits           Cardiovascular  Within defined limits                Exercise tolerance: >4 METS     GI/Hepatic/Renal  Within defined limits              Endo/Other  Within defined limits           Other Findings              Physical Exam    Airway  Mallampati: II  TM Distance: 4 - 6 cm  Neck ROM: normal range of motion   Mouth opening: Normal     Cardiovascular  Regular rate and rhythm,  S1 and S2 normal,  no murmur, click, rub, or gallop  Rhythm: regular  Rate: normal         Dental  No notable dental hx       Pulmonary  Breath sounds clear to auscultation               Abdominal  GI exam deferred       Other Findings            Anesthetic Plan    ASA: 2  Anesthesia type: general          Induction: Intravenous  Anesthetic plan and risks discussed with: Patient

## 2020-07-06 NOTE — PROGRESS NOTES
Discharge instructions reviewed with pt and wife. Both verbalized understanding of instructions. All questions and concerns answered. Ivs taken out. Prescriptions handed to pt.

## 2020-07-06 NOTE — BRIEF OP NOTE
Brief Postoperative Note    Patient: Joan Born  YOB: 1985  MRN: [id number]    Date of Procedure: 7/5/2020     Pre-Op Diagnosis: Acute appendicitis, unspecified acute appendicitis type [K35.80]    Post-Op Diagnosis: Same as preoperative diagnosis.       Procedure(s):  APPENDECTOMY LAPAROSCOPIC    Surgeon(s):  Rashad Daniel MD    Surgical Assistant: None    Anesthesia: General     Estimated Blood Loss (mL): Minimal    Complications: None    Specimens:   ID Type Source Tests Collected by Time Destination   1 : appendix Preservative Appendix  Rashad Daniel MD 7/5/2020 2122 Pathology        Implants: * No implants in log *    Drains:   Orogastric Tube 07/05/20 (Active)       Findings: acute appendicitis, no perforation    Electronically Signed by Lisbeth Kilgore MD on 7/5/2020 at 9:34 PM

## 2022-03-19 PROBLEM — K35.80 ACUTE APPENDICITIS: Status: ACTIVE | Noted: 2020-07-05

## 2023-05-20 RX ORDER — CETIRIZINE HYDROCHLORIDE 10 MG/1
10 TABLET ORAL DAILY PRN
COMMUNITY

## (undated) DEVICE — INFECTION CONTROL KIT SYS

## (undated) DEVICE — CANISTER, RIGID, 3000CC: Brand: MEDLINE INDUSTRIES, INC.

## (undated) DEVICE — RELOAD STPL L45MM H1.5-3.6MM REG TISS BLU GRIPPING SURF B

## (undated) DEVICE — APPLIER CLP M/L SHFT DIA5MM 15 LIG LIGAMAX 5

## (undated) DEVICE — STERILE POLYISOPRENE POWDER-FREE SURGICAL GLOVES: Brand: PROTEXIS

## (undated) DEVICE — TISSUE RETRIEVAL SYSTEM: Brand: INZII RETRIEVAL SYSTEM

## (undated) DEVICE — STAPLER INT L340MM 45MM STD 12 FIRING B FRM PWR + GRIPPING

## (undated) DEVICE — VISUALIZATION SYSTEM: Brand: CLEARIFY

## (undated) DEVICE — NEEDLE HYPO 22GA L1.5IN BLK S STL HUB POLYPR SHLD REG BVL

## (undated) DEVICE — COVER LT HNDL PLAS RIG 1 PER PK

## (undated) DEVICE — SURGICAL PROCEDURE KIT GEN LAPAROSCOPY LF

## (undated) DEVICE — SEALER ENSEAL CUR 3MMX35CM --

## (undated) DEVICE — INSUFFLATION NEEDLE: Brand: SURGINEEDLE

## (undated) DEVICE — SUTURE MCRYL SZ 4-0 L27IN ABSRB UD L19MM PS-2 1/2 CIR PRIM Y426H

## (undated) DEVICE — Device

## (undated) DEVICE — SCISSORS ENDOSCP DIA5MM CRV MPLR CAUT W/ RATCH HNDL

## (undated) DEVICE — SUTURE SZ 0 27IN 5/8 CIR UR-6  TAPER PT VIOLET ABSRB VICRYL J603H

## (undated) DEVICE — REM POLYHESIVE ADULT PATIENT RETURN ELECTRODE: Brand: VALLEYLAB

## (undated) DEVICE — DERMABOND SKIN ADH 0.7ML -- DERMABOND ADVANCED 12/BX

## (undated) DEVICE — SOL IRRIGATION INJ NACL 0.9% 500ML BTL

## (undated) DEVICE — PREP SKN CHLRAPRP APL 26ML STR --

## (undated) DEVICE — LAPAROSCOPIC TROCAR SLEEVE/SINGLE USE: Brand: KII® OPTICAL ACCESS SYSTEM

## (undated) DEVICE — TROCAR: Brand: KII® SLEEVE

## (undated) DEVICE — STERILE POLYISOPRENE POWDER-FREE SURGICAL GLOVES WITH EMOLLIENT COATING: Brand: PROTEXIS

## (undated) DEVICE — RELOAD STPL L45MM H1-2.6MM MESENTERY THN TISS WHT GRIPPING

## (undated) DEVICE — STRAP,POSITIONING,KNEE/BODY,FOAM,4X60": Brand: MEDLINE

## (undated) DEVICE — TUBING INSUF 0.3UM FLTR W/ LUERLOCK CONN

## (undated) DEVICE — TROCAR: Brand: KII OPTICAL ACCESS SYSTEM